# Patient Record
(demographics unavailable — no encounter records)

---

## 2025-05-13 NOTE — HISTORY OF PRESENT ILLNESS
[de-identified] : Swallowed fishbone about 10 days ago.  Went to .  Exam neg at eval .  No imaging done.  No problem eating drinking or breathing . No hemoptysis.  Still feels discomfort with swallowing - feels in middle of throat.

## 2025-05-13 NOTE — REVIEW OF SYSTEMS
[Negative] : Heme/Lymph [Hoarseness] : no hoarseness [Throat Clearing] : no throat clearing [Throat Pain] : no throat pain [Throat Dryness] : no throat dryness [Throat Itching] : no throat itching [de-identified] : fish bone stuck in throat since last week , throat discomfort , denies dysphagia

## 2025-05-13 NOTE — ASSESSMENT
[FreeTextEntry1] : Patient with hx of fish bone ingestion about 10 days ago.  Was seen at  - no Rx at that time or imaging.  Occ discomfort noted but no problems eating, breathing - no coughing up blood or mucous.   No fever Exam today normal - no fb noted on oral exam or on fiberoptic laryngoscopy. Advised patient that he may have scratched area with fb but will get soft tissue film of neck in next 1-2 days  - also patient aware that fb may not show on imaging.   Recommended conservative care and followup 2 weeks unless change in sx such as fever, increased difficulty swallowing or breathing issues.

## 2025-06-24 NOTE — HISTORY OF PRESENT ILLNESS
[de-identified] : 66M presents for evaluation of globus sensation. He states he swallowed fishbone in early May and had FB sensation thereafter. Laryngoscopy performed 5/13 was negative.  Plain neck x-ray also negative. Patient has globus sensation while swallowing, but not at rest. Patient denies throat pain, dysphagia, odynophagia, dysphonia, cough, heartburn, dyspnea or otalgia.

## 2025-06-24 NOTE — PROCEDURE
[Unable to Cooperate with Mirror] : patient unable to cooperate with mirror [Globus] : globus [None] : none [Flexible Endoscope] : examined with the flexible endoscope [Serial Number: ___] : Serial Number: [unfilled] [Normal] : the false vocal folds were pink and regular, the ventricular sulcus was open, the true vocal folds were glistening white, tense and of equal length, mobility, and height [True Vocal Cords Paralysis] : no true vocal cord paralysis [True Vocal Cords Erythematous] : no true vocal cord edema [True Vocal Cords Lepe's Nodules] : no true vocal cord nodules [Glottis Arytenoid Cartilages] : no arytenoid granulomas [Glottis Arytenoid Cartilages Erythema] : no arytenoid erythema [Interarytenoid Edema] : interarytenoid area edematous [de-identified] :  Surgilube

## 2025-06-24 NOTE — CONSULT LETTER
[Dear  ___] : Dear  [unfilled], [Consult Letter:] : I had the pleasure of evaluating your patient, [unfilled]. [Please see my note below.] : Please see my note below. [Consult Closing:] : Thank you very much for allowing me to participate in the care of this patient.  If you have any questions, please do not hesitate to contact me. [Sincerely,] : Sincerely, [FreeTextEntry2] : Daisy Longo MD [FreeTextEntry3] : Pablo Cooper MD, FACS Chief of Otolaryngology and Head & Neck Surgery - [facility]  - Dept. of Otolaryngology - [facility] [phone]